# Patient Record
Sex: MALE | Race: OTHER | HISPANIC OR LATINO
[De-identification: names, ages, dates, MRNs, and addresses within clinical notes are randomized per-mention and may not be internally consistent; named-entity substitution may affect disease eponyms.]

---

## 2018-06-28 ENCOUNTER — APPOINTMENT (OUTPATIENT)
Dept: INTERNAL MEDICINE | Facility: CLINIC | Age: 48
End: 2018-06-28

## 2018-06-29 ENCOUNTER — LABORATORY RESULT (OUTPATIENT)
Age: 48
End: 2018-06-29

## 2018-06-29 ENCOUNTER — APPOINTMENT (OUTPATIENT)
Dept: INTERNAL MEDICINE | Facility: CLINIC | Age: 48
End: 2018-06-29

## 2018-06-29 ENCOUNTER — OUTPATIENT (OUTPATIENT)
Dept: OUTPATIENT SERVICES | Facility: HOSPITAL | Age: 48
LOS: 1 days | End: 2018-06-29
Payer: SELF-PAY

## 2018-06-29 VITALS
BODY MASS INDEX: 31.83 KG/M2 | WEIGHT: 210 LBS | DIASTOLIC BLOOD PRESSURE: 70 MMHG | HEIGHT: 68 IN | SYSTOLIC BLOOD PRESSURE: 134 MMHG

## 2018-06-29 DIAGNOSIS — R73.03 PREDIABETES.: ICD-10-CM

## 2018-06-29 DIAGNOSIS — I10 ESSENTIAL (PRIMARY) HYPERTENSION: ICD-10-CM

## 2018-06-29 LAB
HCT VFR BLD CALC: 42.9 % — SIGNIFICANT CHANGE UP (ref 39–50)
HGB BLD-MCNC: 13.5 G/DL — SIGNIFICANT CHANGE UP (ref 13–17)
MCHC RBC-ENTMCNC: 27.9 PG — SIGNIFICANT CHANGE UP (ref 27–34)
MCHC RBC-ENTMCNC: 31.5 GM/DL — LOW (ref 32–36)
MCV RBC AUTO: 88.6 FL — SIGNIFICANT CHANGE UP (ref 80–100)
PLATELET # BLD AUTO: 236 K/UL — SIGNIFICANT CHANGE UP (ref 150–400)
RBC # BLD: 4.84 M/UL — SIGNIFICANT CHANGE UP (ref 4.2–5.8)
RBC # FLD: 13.9 % — SIGNIFICANT CHANGE UP (ref 10.3–14.5)
WBC # BLD: 5.36 K/UL — SIGNIFICANT CHANGE UP (ref 3.8–10.5)
WBC # FLD AUTO: 5.36 K/UL — SIGNIFICANT CHANGE UP (ref 3.8–10.5)

## 2018-06-29 PROCEDURE — 83036 HEMOGLOBIN GLYCOSYLATED A1C: CPT

## 2018-06-29 PROCEDURE — 85027 COMPLETE CBC AUTOMATED: CPT

## 2018-06-29 PROCEDURE — G0463: CPT

## 2018-06-29 PROCEDURE — 80053 COMPREHEN METABOLIC PANEL: CPT

## 2018-06-29 NOTE — REVIEW OF SYSTEMS
[Fever] : no fever [Chills] : no chills [Night Sweats] : no night sweats [Discharge] : no discharge [Pain] : no pain [Vision Problems] : no vision problems [Earache] : no earache [Hearing Loss] : no hearing loss [Nasal Discharge] : no nasal discharge [Chest Pain] : no chest pain [Orthopena] : no orthopnea [Shortness Of Breath] : no shortness of breath [Wheezing] : no wheezing [Abdominal Pain] : no abdominal pain [Nausea] : no nausea [Vomiting] : no vomiting [Dysuria] : no dysuria [Hematuria] : no hematuria [Joint Pain] : no joint pain [Joint Stiffness] : no joint stiffness [Back Pain] : no back pain [Itching] : no itching [Mole Changes] : no mole changes [Headache] : no headache [Dizziness] : no dizziness [Unsteady Walk] : no ataxia [Memory Loss] : no memory loss [Suicidal] : not suicidal [Insomnia] : no insomnia [Easy Bleeding] : no easy bleeding [Easy Bruising] : no easy bruising [FreeTextEntry9] : R>L hand numbness [de-identified] : R>L hand numbness

## 2018-06-29 NOTE — HISTORY OF PRESENT ILLNESS
[Spouse] : spouse [FreeTextEntry8] : Pt is a 47 yo M with PMH gout, dental infection, mixed HLD, Vit D def presenting as an acute visit for hand numbness on the right>left. This has been ongoing for about one month. It gets worse when he goes to sleep. No leg/foot numbness or tingling. Prediabetic on prior visit, diet-controlled. The numbness is localized to his first four fingers. No associated arm or hand weakness reported. He does have hand tingling. For work he is a  in construction. He is right-handed. No pain in wrist/fingers/thumb. No hx of spinal/head trauma or neck pain. He has been going to the gym and weight-lifting more over the past 2 months\par \par Peruvian intep ID # [id number]. Wife arrived and pt elected to have her translate for the final portion of this interview.

## 2018-06-29 NOTE — PHYSICAL EXAM
[No Acute Distress] : no acute distress [Well Nourished] : well nourished [Well Developed] : well developed [Normal Sclera/Conjunctiva] : normal sclera/conjunctiva [PERRL] : pupils equal round and reactive to light [Normal Outer Ear/Nose] : the outer ears and nose were normal in appearance [Normal Oropharynx] : the oropharynx was normal [No JVD] : no jugular venous distention [Supple] : supple [No Respiratory Distress] : no respiratory distress  [Clear to Auscultation] : lungs were clear to auscultation bilaterally [Normal Rate] : normal rate  [Regular Rhythm] : with a regular rhythm [Normal S1, S2] : normal S1 and S2 [No Carotid Bruits] : no carotid bruits [No Edema] : there was no peripheral edema [Soft] : abdomen soft [Non Tender] : non-tender [No HSM] : no HSM [Normal Posterior Cervical Nodes] : no posterior cervical lymphadenopathy [Normal Anterior Cervical Nodes] : no anterior cervical lymphadenopathy [No CVA Tenderness] : no CVA  tenderness [No Spinal Tenderness] : no spinal tenderness [No Joint Swelling] : no joint swelling [Grossly Normal Strength/Tone] : grossly normal strength/tone [No Rash] : no rash [Normal Gait] : normal gait [Coordination Grossly Intact] : coordination grossly intact [Normal Affect] : the affect was normal [Normal Insight/Judgement] : insight and judgment were intact [Acne] : no acne [de-identified] : well-built [de-identified] : positive Phalen's/Tinel's bilaterally, no thenar atrophy, no sensory loss, intact  and bilateral upper extremity strength

## 2018-06-29 NOTE — ASSESSMENT
[FreeTextEntry1] : 49 yo M with PMH gout, dental infection, mixed HLD, Vit D def presenting as an acute visit for hand numbness on the right>left, exam and history concerning for R>L carpal tunnel syndrome.\par \par #Carpal Tunnel Syndrome, bilateral: R>L hand tingling without associated hypothenar atrophy or sensory loss, intact strength. Consistent with mild disease. Conservative management for now. This is a new diagnosis for the patient. \par -nocturnal splinting. Provided with address for local surgical supply to  splint.\par -if progression of symptoms over the next 2-3 weeks, patient will call and will need neuro referral followed by ortho referral at that time.\par -lifestyle and activity counseling provided: no heavy lifting. Pt works as  but will nocturnally splint.\par \par #Mixed HLD: repeat lipid profile next visit, unclear if fasting on prior. Not fasting today.\par \par #PreDM: repeat A1C today, last 6.0%. Pt has been exercising more and watching his diet since last visit.\par -dietary and lifestyle counseling provided.\par \par HCM:\par -lipid profile (defer today, not fasting)\par -A1C\par -Tdap next CPE, deferred today\par -CBC, CMP \par \par Case d/w Dr. Temple\par RTC 2-3 weeks if not improving, o/w for CPE as available

## 2018-06-30 LAB
ALBUMIN SERPL ELPH-MCNC: 4.5 G/DL — SIGNIFICANT CHANGE UP (ref 3.3–5)
ALP SERPL-CCNC: 65 U/L — SIGNIFICANT CHANGE UP (ref 40–120)
ALT FLD-CCNC: 34 U/L — SIGNIFICANT CHANGE UP (ref 10–45)
ANION GAP SERPL CALC-SCNC: 16 MMOL/L — SIGNIFICANT CHANGE UP (ref 5–17)
AST SERPL-CCNC: 28 U/L — SIGNIFICANT CHANGE UP (ref 10–40)
BILIRUB SERPL-MCNC: 0.3 MG/DL — SIGNIFICANT CHANGE UP (ref 0.2–1.2)
BUN SERPL-MCNC: 25 MG/DL — HIGH (ref 7–23)
CALCIUM SERPL-MCNC: 9.8 MG/DL — SIGNIFICANT CHANGE UP (ref 8.4–10.5)
CHLORIDE SERPL-SCNC: 104 MMOL/L — SIGNIFICANT CHANGE UP (ref 96–108)
CO2 SERPL-SCNC: 20 MMOL/L — LOW (ref 22–31)
CREAT SERPL-MCNC: 1.12 MG/DL — SIGNIFICANT CHANGE UP (ref 0.5–1.3)
GLUCOSE SERPL-MCNC: 111 MG/DL — HIGH (ref 70–99)
HBA1C BLD-MCNC: 5.9 % — HIGH (ref 4–5.6)
POTASSIUM SERPL-MCNC: 5 MMOL/L — SIGNIFICANT CHANGE UP (ref 3.5–5.3)
POTASSIUM SERPL-SCNC: 5 MMOL/L — SIGNIFICANT CHANGE UP (ref 3.5–5.3)
PROT SERPL-MCNC: 7.9 G/DL — SIGNIFICANT CHANGE UP (ref 6–8.3)
SODIUM SERPL-SCNC: 140 MMOL/L — SIGNIFICANT CHANGE UP (ref 135–145)

## 2018-07-03 DIAGNOSIS — E78.2 MIXED HYPERLIPIDEMIA: ICD-10-CM

## 2018-07-03 DIAGNOSIS — G56.03 CARPAL TUNNEL SYNDROME, BILATERAL UPPER LIMBS: ICD-10-CM

## 2018-07-03 DIAGNOSIS — R20.0 ANESTHESIA OF SKIN: ICD-10-CM

## 2018-07-03 DIAGNOSIS — R73.03 PREDIABETES: ICD-10-CM

## 2018-07-09 ENCOUNTER — APPOINTMENT (OUTPATIENT)
Dept: INTERNAL MEDICINE | Facility: CLINIC | Age: 48
End: 2018-07-09

## 2018-07-09 DIAGNOSIS — K04.7 PERIAPICAL ABSCESS W/OUT SINUS: ICD-10-CM

## 2018-07-09 DIAGNOSIS — G56.03 CARPAL TUNNEL SYNDROM,BILATERAL UPPER LIMBS: ICD-10-CM

## 2018-07-09 DIAGNOSIS — R20.0 ANESTHESIA OF SKIN: ICD-10-CM

## 2019-02-06 ENCOUNTER — OUTPATIENT (OUTPATIENT)
Dept: OUTPATIENT SERVICES | Facility: HOSPITAL | Age: 49
LOS: 1 days | End: 2019-02-06
Payer: SELF-PAY

## 2019-02-06 ENCOUNTER — APPOINTMENT (OUTPATIENT)
Dept: INTERNAL MEDICINE | Facility: CLINIC | Age: 49
End: 2019-02-06

## 2019-02-06 VITALS
SYSTOLIC BLOOD PRESSURE: 140 MMHG | HEART RATE: 84 BPM | WEIGHT: 218 LBS | DIASTOLIC BLOOD PRESSURE: 90 MMHG | BODY MASS INDEX: 33.04 KG/M2 | HEIGHT: 68 IN

## 2019-02-06 DIAGNOSIS — Z82.0 FAMILY HISTORY OF EPILEPSY AND OTHER DISEASES OF THE NERVOUS SYSTEM: ICD-10-CM

## 2019-02-06 DIAGNOSIS — E88.81 METABOLIC SYNDROME: ICD-10-CM

## 2019-02-06 DIAGNOSIS — E55.9 VITAMIN D DEFICIENCY, UNSPECIFIED: ICD-10-CM

## 2019-02-06 DIAGNOSIS — E66.9 OBESITY, UNSPECIFIED: ICD-10-CM

## 2019-02-06 DIAGNOSIS — I10 ESSENTIAL (PRIMARY) HYPERTENSION: ICD-10-CM

## 2019-02-06 PROCEDURE — G0463: CPT

## 2019-02-07 PROBLEM — Z82.0 FAMILY HISTORY OF ALZHEIMER'S DISEASE: Status: ACTIVE | Noted: 2019-02-06

## 2019-02-07 PROBLEM — E88.81 METABOLIC SYNDROME: Status: ACTIVE | Noted: 2019-02-07

## 2019-02-07 PROBLEM — E66.9 OBESITY (BMI 30-39.9): Status: ACTIVE | Noted: 2019-02-07

## 2019-02-07 NOTE — HISTORY OF PRESENT ILLNESS
[Spouse] : spouse [FreeTextEntry1] : 50 yo Vietnamese-speaking M with PMH gout, dental infection, mixed HLD, Vit D def presenting for follow up of a recently elevated blood pressure.  [de-identified] : Patient's wife translates from [place] with patient's consent. Patient checked his blood pressure at home 10 days ago, which was 147 systolic, unknown diastolic. His blood pressure is usually closer to 120/80, checked at home by his daughter who is a nursing student. \par \par Patient is feeling well now, but was feeling unwell for the past 2 weeks, feeling tired, with posterior headaches, disrupted sleep, however this has been improving over the past week. Denies fever. Last week patient took tylenol for his headache which did not help, but Aleve did help the pain, and now the headache is resolved. The fatigue and disrupted sleep are also "much better". Patient's wife attributes patient's improvement to the fact that she has changed patient's diet recently to increase vegetables and meats, and cut down on rice, bread, and juice. Patient has no trouble falling asleep, but has multiple nighttime awakenings (trouble staying asleep) increasingly over the past month. When he would wake up repeatedly through the night last week, it was due to feeling anxious about the posterior headache he was experiencing.

## 2019-02-07 NOTE — REVIEW OF SYSTEMS
[Fatigue] : fatigue [Vision Problems] : vision problems [Negative] : Psychiatric [FreeTextEntry3] : pt says he has trouble reading small print [de-identified] : occasional left axillary itching [de-identified] : HA last week resolved now

## 2019-02-07 NOTE — ASSESSMENT
[FreeTextEntry1] : 50 yo Kazakh-speaking M with PMH gout, dental infection, mixed HLD, Vit D def presenting for follow up of a recently elevated blood pressure.\par \par # Metabolic Syndrome (ASCVD 5.4%)\par - pre-diabetic A1C, hyperlipidemia, hypertension\par - reinforced diet and exercise (lifestyle modifications) to control BP, will hold off on BP medications at this time\par - consider initiating a statin, metformin, or anti-hypertensive medications if lifestyle modification insufficient\par \par # HCM\par - flu shot and TDAP administered today\par - RTC 3 mo for CPE, fasting to check lipid panel/CBC/CMP, and try to lose 10lb in that time

## 2019-02-07 NOTE — PHYSICAL EXAM
[No Acute Distress] : no acute distress [Well Nourished] : well nourished [Well Developed] : well developed [Well-Appearing] : well-appearing [Normal Sclera/Conjunctiva] : normal sclera/conjunctiva [PERRL] : pupils equal round and reactive to light [EOMI] : extraocular movements intact [Normal Outer Ear/Nose] : the outer ears and nose were normal in appearance [Normal Oropharynx] : the oropharynx was normal [No JVD] : no jugular venous distention [Supple] : supple [No Lymphadenopathy] : no lymphadenopathy [No Respiratory Distress] : no respiratory distress  [Clear to Auscultation] : lungs were clear to auscultation bilaterally [No Accessory Muscle Use] : no accessory muscle use [Normal Rate] : normal rate  [Regular Rhythm] : with a regular rhythm [Soft] : abdomen soft [Non Tender] : non-tender [Normal Bowel Sounds] : normal bowel sounds [No Joint Swelling] : no joint swelling [Grossly Normal Strength/Tone] : grossly normal strength/tone [No Rash] : no rash [Normal Gait] : normal gait [Coordination Grossly Intact] : coordination grossly intact [Normal Affect] : the affect was normal [Normal Insight/Judgement] : insight and judgment were intact [de-identified] : full ROM

## 2019-02-19 DIAGNOSIS — E66.9 OBESITY, UNSPECIFIED: ICD-10-CM

## 2019-02-19 DIAGNOSIS — Z82.0 FAMILY HISTORY OF EPILEPSY AND OTHER DISEASES OF THE NERVOUS SYSTEM: ICD-10-CM

## 2019-02-19 DIAGNOSIS — E88.81 METABOLIC SYNDROME AND OTHER INSULIN RESISTANCE: ICD-10-CM

## 2019-10-23 NOTE — DISCUSSION/SUMMARY
[FreeTextEntry1] : Called pt regarding his back pain and finger pain and advised to come in for an evaluation.  Reported he already has an apt  on 11/6 at 8:30 am

## 2019-11-06 ENCOUNTER — OUTPATIENT (OUTPATIENT)
Dept: OUTPATIENT SERVICES | Facility: HOSPITAL | Age: 49
LOS: 1 days | End: 2019-11-06
Payer: SELF-PAY

## 2019-11-06 ENCOUNTER — APPOINTMENT (OUTPATIENT)
Dept: INTERNAL MEDICINE | Facility: CLINIC | Age: 49
End: 2019-11-06

## 2019-11-06 DIAGNOSIS — M54.5 LOW BACK PAIN: ICD-10-CM

## 2019-11-06 DIAGNOSIS — I10 ESSENTIAL (PRIMARY) HYPERTENSION: ICD-10-CM

## 2019-11-06 PROCEDURE — G0463: CPT

## 2019-12-30 PROBLEM — M54.5 LOW BACK PAIN, UNSPECIFIED BACK PAIN LATERALITY, UNSPECIFIED CHRONICITY, UNSPECIFIED WHETHER SCIATICA PRESENT: Status: ACTIVE | Noted: 2019-12-30

## 2020-01-15 NOTE — HISTORY OF PRESENT ILLNESS
[FreeTextEntry8] : Patient is 48yo male PMH gout, dental infection, mixed HLD, Vit D def presenting with back and foot pain. \par \par Back pain is secondary to bending to the left side, and is described as "pulling" sensation. Better with heat. Denies recent trauma. \par Patient also describes pain in the lateral right foot, made worse by wearing new sneakers. Denies recent trauma, swelling, erythema.

## 2020-01-15 NOTE — ASSESSMENT
[FreeTextEntry1] : Patient is 50yo male presenting with back pain. Appears to be musculoskeletal in nature, with evidence of tense parapsinal muscles.\par \par Patient also with foot pain; likely related to wearing specific shoes as pain only occurs with these sneakers.

## 2020-01-15 NOTE — PHYSICAL EXAM
[No Acute Distress] : no acute distress [Well Nourished] : well nourished [Well Developed] : well developed [Well-Appearing] : well-appearing [Normal Sclera/Conjunctiva] : normal sclera/conjunctiva [EOMI] : extraocular movements intact [PERRL] : pupils equal round and reactive to light [Normal Outer Ear/Nose] : the outer ears and nose were normal in appearance [No Respiratory Distress] : no respiratory distress  [Normal Oropharynx] : the oropharynx was normal [No Accessory Muscle Use] : no accessory muscle use [Clear to Auscultation] : lungs were clear to auscultation bilaterally [Normal Rate] : normal rate  [Regular Rhythm] : with a regular rhythm [Normal S1, S2] : normal S1 and S2 [No Murmur] : no murmur heard [No CVA Tenderness] : no CVA  tenderness [No Spinal Tenderness] : no spinal tenderness [No Joint Swelling] : no joint swelling [Grossly Normal Strength/Tone] : grossly normal strength/tone [No Focal Deficits] : no focal deficits [No Rash] : no rash [Normal Gait] : normal gait [de-identified] : Tenderness to palpation of right lateral foot at tendon insertion. No erythema, swelling. No bone malformations. [de-identified] : Tenderness to paraspinal muscles of lumbar region. Tightness noted in muscles.

## 2020-01-15 NOTE — PLAN
[FreeTextEntry1] : #Back pain 2/2 to musculoskeletal strain\par -conservative treatment for now\par -heat, NSAIDs as needed\par -Stretches\par -PT if needed\par \par #Foot pain\par -ice and NSAIDS as needed\par -switch footwear

## 2020-01-15 NOTE — REVIEW OF SYSTEMS
[Muscle Pain] : muscle pain [Back Pain] : back pain [Fever] : no fever [Fatigue] : no fatigue [Discharge] : no discharge [Vision Problems] : no vision problems [Sore Throat] : no sore throat [Nasal Discharge] : no nasal discharge [Chest Pain] : no chest pain [Palpitations] : no palpitations [Lower Ext Edema] : no lower extremity edema [Shortness Of Breath] : no shortness of breath [Cough] : no cough [Abdominal Pain] : no abdominal pain [Vomiting] : no vomiting [Nausea] : no nausea [Melena] : no melena [Dysuria] : no dysuria [Joint Stiffness] : no joint stiffness [Joint Pain] : no joint pain [Hematuria] : no hematuria [Itching] : no itching [Skin Rash] : no skin rash [Headache] : no headache [Dizziness] : no dizziness

## 2021-06-03 ENCOUNTER — LABORATORY RESULT (OUTPATIENT)
Age: 51
End: 2021-06-03

## 2021-06-04 ENCOUNTER — APPOINTMENT (OUTPATIENT)
Dept: INTERNAL MEDICINE | Facility: CLINIC | Age: 51
End: 2021-06-04

## 2021-06-04 ENCOUNTER — OUTPATIENT (OUTPATIENT)
Dept: OUTPATIENT SERVICES | Facility: HOSPITAL | Age: 51
LOS: 1 days | End: 2021-06-04
Payer: SELF-PAY

## 2021-06-04 VITALS
DIASTOLIC BLOOD PRESSURE: 80 MMHG | SYSTOLIC BLOOD PRESSURE: 140 MMHG | BODY MASS INDEX: 32.74 KG/M2 | OXYGEN SATURATION: 96 % | HEIGHT: 68 IN | HEART RATE: 76 BPM | WEIGHT: 216 LBS

## 2021-06-04 DIAGNOSIS — F32.9 MAJOR DEPRESSIVE DISORDER, SINGLE EPISODE, UNSPECIFIED: ICD-10-CM

## 2021-06-04 DIAGNOSIS — Z23 ENCOUNTER FOR IMMUNIZATION: ICD-10-CM

## 2021-06-04 DIAGNOSIS — R53.83 OTHER FATIGUE: ICD-10-CM

## 2021-06-04 DIAGNOSIS — I10 ESSENTIAL (PRIMARY) HYPERTENSION: ICD-10-CM

## 2021-06-04 PROCEDURE — G0463: CPT

## 2021-06-04 PROCEDURE — 85027 COMPLETE CBC AUTOMATED: CPT

## 2021-06-04 PROCEDURE — 84443 ASSAY THYROID STIM HORMONE: CPT

## 2021-06-04 PROCEDURE — 80053 COMPREHEN METABOLIC PANEL: CPT

## 2021-06-04 PROCEDURE — 80061 LIPID PANEL: CPT

## 2021-06-05 LAB
ALBUMIN SERPL ELPH-MCNC: 4.9 G/DL — SIGNIFICANT CHANGE UP (ref 3.3–5)
ALP SERPL-CCNC: 75 U/L — SIGNIFICANT CHANGE UP (ref 40–120)
ALT FLD-CCNC: 52 U/L — HIGH (ref 10–45)
ANION GAP SERPL CALC-SCNC: 15 MMOL/L — SIGNIFICANT CHANGE UP (ref 5–17)
AST SERPL-CCNC: 34 U/L — SIGNIFICANT CHANGE UP (ref 10–40)
BILIRUB SERPL-MCNC: 0.4 MG/DL — SIGNIFICANT CHANGE UP (ref 0.2–1.2)
BUN SERPL-MCNC: 19 MG/DL — SIGNIFICANT CHANGE UP (ref 7–23)
CALCIUM SERPL-MCNC: 10.2 MG/DL — SIGNIFICANT CHANGE UP (ref 8.4–10.5)
CHLORIDE SERPL-SCNC: 101 MMOL/L — SIGNIFICANT CHANGE UP (ref 96–108)
CHOLEST SERPL-MCNC: 265 MG/DL — HIGH
CO2 SERPL-SCNC: 26 MMOL/L — SIGNIFICANT CHANGE UP (ref 22–31)
CREAT SERPL-MCNC: 1.06 MG/DL — SIGNIFICANT CHANGE UP (ref 0.5–1.3)
GLUCOSE SERPL-MCNC: 67 MG/DL — LOW (ref 70–99)
HCT VFR BLD CALC: 44.9 % — SIGNIFICANT CHANGE UP (ref 39–50)
HDLC SERPL-MCNC: 48 MG/DL — SIGNIFICANT CHANGE UP
HGB BLD-MCNC: 14.3 G/DL — SIGNIFICANT CHANGE UP (ref 13–17)
LIPID PNL WITH DIRECT LDL SERPL: 161 MG/DL — HIGH
MCHC RBC-ENTMCNC: 28.9 PG — SIGNIFICANT CHANGE UP (ref 27–34)
MCHC RBC-ENTMCNC: 31.8 GM/DL — LOW (ref 32–36)
MCV RBC AUTO: 90.7 FL — SIGNIFICANT CHANGE UP (ref 80–100)
NON HDL CHOLESTEROL: 217 MG/DL — HIGH
PLATELET # BLD AUTO: 223 K/UL — SIGNIFICANT CHANGE UP (ref 150–400)
POTASSIUM SERPL-MCNC: 4.6 MMOL/L — SIGNIFICANT CHANGE UP (ref 3.5–5.3)
POTASSIUM SERPL-SCNC: 4.6 MMOL/L — SIGNIFICANT CHANGE UP (ref 3.5–5.3)
PROT SERPL-MCNC: 7.7 G/DL — SIGNIFICANT CHANGE UP (ref 6–8.3)
RBC # BLD: 4.95 M/UL — SIGNIFICANT CHANGE UP (ref 4.2–5.8)
RBC # FLD: 13.2 % — SIGNIFICANT CHANGE UP (ref 10.3–14.5)
SODIUM SERPL-SCNC: 141 MMOL/L — SIGNIFICANT CHANGE UP (ref 135–145)
T4 FREE+ TSH PNL SERPL: 1.5 UIU/ML — SIGNIFICANT CHANGE UP (ref 0.27–4.2)
TRIGL SERPL-MCNC: 282 MG/DL — HIGH
WBC # BLD: 4.66 K/UL — SIGNIFICANT CHANGE UP (ref 3.8–10.5)
WBC # FLD AUTO: 4.66 K/UL — SIGNIFICANT CHANGE UP (ref 3.8–10.5)

## 2021-06-07 PROBLEM — F32.9 DEPRESSION: Status: ACTIVE | Noted: 2021-06-04

## 2021-06-07 NOTE — HEALTH RISK ASSESSMENT
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

## 2021-06-07 NOTE — HISTORY OF PRESENT ILLNESS
[FreeTextEntry1] : CPE [de-identified] : Patient is a [place] citizen speaker and refused an  numerous times. He insisted on communicating in English with me and also using his wife, who is fluent in English, [place] citizen, and English communicate/interpret.\par \par Patient is a 52 yo male with a hx of gout, HLD, vitamin d deficiency (not on any medication) who presents to the clinic for an annual visit. Patient states that he feels otherwise well. However, he has stated that his mood has not been well recently. Of note, he has noticed increased stressors in his life. Since the last year, he has had increased financial strain since his job had temporarily let him go and his wife lost his job. They were able to make ends meet with the assistance of their daughter who financially supported them for several months. Furthermore, they have been having marital issues that have been on-going for several years. His wife has asked for a separation. However, the patient has refused to acknowledge the divorce/separation and insists on trying to work things out. Furthermore, they have had a younger daughter who has been acting out and causing them much grief. He has also lost both his parents in the last five years (not due to COVID) and several other family members). Of note, patient has had a hx of remote suicidal ideation (more than 15 years ago), but no recent ideation/intent/plan. About 15 years ago, the first time his wife asked for a separation, patient wanted to end his life. So, he went to the train tracks and was about to lie down. His sister chased him down at the train tracks and convinced him to stop. Since then, he has not had these thoughts, and denies any recent thoughts/intents/plans. He denies having any guns or weapons in the house.

## 2021-06-07 NOTE — PLAN
[FreeTextEntry1] :  52 yo male with a hx of gout, HLD, vitamin d deficiency (not on any medication) who presents to the clinic for an annual visit, found to have newly diagnosed depression.\par - Follow up at 2 weeks and 5 weeks with telehealth for Depression follow up.\par - HCM: Colonoscopy Outpatient offered and accepted\par - Routine labwork ordered\par - Case d/w Dr. Meza

## 2021-06-07 NOTE — PHYSICAL EXAM
[No Acute Distress] : no acute distress [Well Nourished] : well nourished [Well Developed] : well developed [Well-Appearing] : well-appearing [Normal Sclera/Conjunctiva] : normal sclera/conjunctiva [PERRL] : pupils equal round and reactive to light [EOMI] : extraocular movements intact [Normal Outer Ear/Nose] : the outer ears and nose were normal in appearance [Normal Oropharynx] : the oropharynx was normal [No JVD] : no jugular venous distention [No Lymphadenopathy] : no lymphadenopathy [Supple] : supple [Thyroid Normal, No Nodules] : the thyroid was normal and there were no nodules present [No Respiratory Distress] : no respiratory distress  [No Accessory Muscle Use] : no accessory muscle use [Clear to Auscultation] : lungs were clear to auscultation bilaterally [Normal Rate] : normal rate  [Regular Rhythm] : with a regular rhythm [Normal S1, S2] : normal S1 and S2 [No Murmur] : no murmur heard [No Carotid Bruits] : no carotid bruits [No Abdominal Bruit] : a ~M bruit was not heard ~T in the abdomen [No Varicosities] : no varicosities [Pedal Pulses Present] : the pedal pulses are present [No Edema] : there was no peripheral edema [No Palpable Aorta] : no palpable aorta [No Extremity Clubbing/Cyanosis] : no extremity clubbing/cyanosis [Soft] : abdomen soft [Non Tender] : non-tender [Non-distended] : non-distended [No Masses] : no abdominal mass palpated [No HSM] : no HSM [Normal Bowel Sounds] : normal bowel sounds [Normal Posterior Cervical Nodes] : no posterior cervical lymphadenopathy [Normal Anterior Cervical Nodes] : no anterior cervical lymphadenopathy [No CVA Tenderness] : no CVA  tenderness [No Spinal Tenderness] : no spinal tenderness [No Joint Swelling] : no joint swelling [Grossly Normal Strength/Tone] : grossly normal strength/tone [Coordination Grossly Intact] : coordination grossly intact [No Rash] : no rash [No Focal Deficits] : no focal deficits [Normal Gait] : normal gait [Deep Tendon Reflexes (DTR)] : deep tendon reflexes were 2+ and symmetric [Memory Grossly Normal] : memory grossly normal [Normal Affect] : the affect was normal [Alert and Oriented x3] : oriented to person, place, and time [Normal Insight/Judgement] : insight and judgment were intact [de-identified] : Depressed mood, teary affect

## 2021-06-09 ENCOUNTER — NON-APPOINTMENT (OUTPATIENT)
Age: 51
End: 2021-06-09

## 2021-06-15 DIAGNOSIS — F32.9 MAJOR DEPRESSIVE DISORDER, SINGLE EPISODE, UNSPECIFIED: ICD-10-CM

## 2021-06-29 ENCOUNTER — RX RENEWAL (OUTPATIENT)
Age: 51
End: 2021-06-29

## 2021-06-29 ENCOUNTER — NON-APPOINTMENT (OUTPATIENT)
Age: 51
End: 2021-06-29

## 2021-11-30 ENCOUNTER — RX RENEWAL (OUTPATIENT)
Age: 51
End: 2021-11-30

## 2022-01-27 ENCOUNTER — NON-APPOINTMENT (OUTPATIENT)
Age: 52
End: 2022-01-27

## 2022-01-28 ENCOUNTER — APPOINTMENT (OUTPATIENT)
Dept: INTERNAL MEDICINE | Facility: CLINIC | Age: 52
End: 2022-01-28

## 2022-12-21 ENCOUNTER — NON-APPOINTMENT (OUTPATIENT)
Age: 52
End: 2022-12-21

## 2023-05-19 ENCOUNTER — RESULT REVIEW (OUTPATIENT)
Age: 53
End: 2023-05-19

## 2023-05-19 ENCOUNTER — OUTPATIENT (OUTPATIENT)
Dept: OUTPATIENT SERVICES | Facility: HOSPITAL | Age: 53
LOS: 1 days | End: 2023-05-19
Payer: SELF-PAY

## 2023-05-19 ENCOUNTER — APPOINTMENT (OUTPATIENT)
Dept: INTERNAL MEDICINE | Facility: CLINIC | Age: 53
End: 2023-05-19
Payer: COMMERCIAL

## 2023-05-19 VITALS
HEART RATE: 75 BPM | OXYGEN SATURATION: 97 % | SYSTOLIC BLOOD PRESSURE: 140 MMHG | WEIGHT: 237 LBS | DIASTOLIC BLOOD PRESSURE: 80 MMHG | HEIGHT: 68 IN | BODY MASS INDEX: 35.92 KG/M2

## 2023-05-19 DIAGNOSIS — I10 ESSENTIAL (PRIMARY) HYPERTENSION: ICD-10-CM

## 2023-05-19 DIAGNOSIS — S96.912A STRAIN OF UNSPECIFIED MUSCLE AND TENDON AT ANKLE AND FOOT LEVEL, LEFT FOOT, INITIAL ENCOUNTER: ICD-10-CM

## 2023-05-19 PROCEDURE — ZZZZZ: CPT

## 2023-05-19 NOTE — HISTORY OF PRESENT ILLNESS
[Musculoskeletal Symptoms Legs] : leg [Rest] : rest [Ice] : ice [OTC Remedies] : OTC remedies [Activity] : with activity [Worsening] : worsening [Family Member] : family member [FreeTextEntry1] : 3 months ago [FreeTextEntry3] : 6-7 [FreeTextEntry7] : L foot/ankle [FreeTextEntry8] : 53 year old man with a history of HLD, MDD presenting with complaint of L foot pain for several months. \par \par Twisted his L foot 6 months ago while running, the pain stopped for awhile and then came back a few months ago. During the intial injury he twisted his foot inward. Now feels pain on the lateral aspect of the ankle and the medial aspect near the big toe. \par \par Works as a  and has to stand on a ladder, foot hurts at the end of the work day, ankle and sole of foot. Pins and needles. No numbness or radiating. States that it is swollen.\par Has been taking 600mg ibuprofen daily and it helps. Has also been icing it. \par No prior injuries. \par \par Chart history of gout but patient denies.

## 2023-05-19 NOTE — PHYSICAL EXAM
[Normal] : normal rate, regular rhythm, normal S1 and S2 and no murmur heard [de-identified] : edema of L ankle join around the malleolus, non tender to palpation, pain elicited with eversion and inversion of L foot, no obvious deformity

## 2023-05-19 NOTE — ASSESSMENT
[FreeTextEntry1] : 53 year old man with a history of MDD, HLD, HTN presenting for acute visit complaining of worsening L foot pain for several months following an injury. Exam is notable for swelling of the lateral L ankle and pain with internal and external rotation of the ankle joint. \par \par #L ankle injury\par strain vs ligamentous injury vs fracture\par - Xray of L foot and ankle\par - ortho referral given\par - continue with ibuprofen 600 mg qhs\par - continue with daily ice and elevation\par - tylenol 625mg qd \par \par #HTN\par - c/w losartan 25mg qd\par - managed by another PCP \par \par #HCM\par - per PCP\par \par Case discussed with Dr. Calhoun.\par Ghazala Woodard, PGY1 Psychiatry Resident

## 2023-05-20 ENCOUNTER — APPOINTMENT (OUTPATIENT)
Dept: RADIOLOGY | Facility: CLINIC | Age: 53
End: 2023-05-20

## 2023-05-20 DIAGNOSIS — S96.912A STRAIN OF UNSPECIFIED MUSCLE AND TENDON AT ANKLE AND FOOT LEVEL, LEFT FOOT, INITIAL ENCOUNTER: ICD-10-CM

## 2023-05-20 PROCEDURE — 73630 X-RAY EXAM OF FOOT: CPT | Mod: 26,LT

## 2023-05-20 PROCEDURE — G0463: CPT

## 2023-05-20 PROCEDURE — 73630 X-RAY EXAM OF FOOT: CPT

## 2023-05-20 PROCEDURE — 73600 X-RAY EXAM OF ANKLE: CPT

## 2023-05-20 PROCEDURE — 73600 X-RAY EXAM OF ANKLE: CPT | Mod: 26,LT

## 2023-06-19 ENCOUNTER — APPOINTMENT (OUTPATIENT)
Dept: INTERNAL MEDICINE | Facility: CLINIC | Age: 53
End: 2023-06-19
Payer: COMMERCIAL

## 2023-06-19 ENCOUNTER — MED ADMIN CHARGE (OUTPATIENT)
Age: 53
End: 2023-06-19

## 2023-06-19 ENCOUNTER — OUTPATIENT (OUTPATIENT)
Dept: OUTPATIENT SERVICES | Facility: HOSPITAL | Age: 53
LOS: 1 days | End: 2023-06-19
Payer: SELF-PAY

## 2023-06-19 VITALS
OXYGEN SATURATION: 98 % | SYSTOLIC BLOOD PRESSURE: 152 MMHG | HEIGHT: 68 IN | WEIGHT: 246 LBS | DIASTOLIC BLOOD PRESSURE: 98 MMHG | HEART RATE: 80 BPM | BODY MASS INDEX: 37.28 KG/M2

## 2023-06-19 VITALS — SYSTOLIC BLOOD PRESSURE: 142 MMHG | DIASTOLIC BLOOD PRESSURE: 90 MMHG

## 2023-06-19 DIAGNOSIS — I10 ESSENTIAL (PRIMARY) HYPERTENSION: ICD-10-CM

## 2023-06-19 DIAGNOSIS — E78.2 MIXED HYPERLIPIDEMIA: ICD-10-CM

## 2023-06-19 DIAGNOSIS — Z00.00 ENCOUNTER FOR GENERAL ADULT MEDICAL EXAMINATION W/OUT ABNORMAL FINDINGS: ICD-10-CM

## 2023-06-19 DIAGNOSIS — Z12.11 ENCOUNTER FOR SCREENING FOR MALIGNANT NEOPLASM OF COLON: ICD-10-CM

## 2023-06-19 DIAGNOSIS — R51.9 HEADACHE, UNSPECIFIED: ICD-10-CM

## 2023-06-19 PROCEDURE — G0463: CPT

## 2023-06-19 PROCEDURE — 80061 LIPID PANEL: CPT

## 2023-06-19 PROCEDURE — T1013: CPT

## 2023-06-19 PROCEDURE — 80053 COMPREHEN METABOLIC PANEL: CPT

## 2023-06-19 PROCEDURE — 83036 HEMOGLOBIN GLYCOSYLATED A1C: CPT

## 2023-06-19 PROCEDURE — 36415 COLL VENOUS BLD VENIPUNCTURE: CPT

## 2023-06-19 PROCEDURE — ZZZZZ: CPT | Mod: GE

## 2023-06-19 PROCEDURE — 85025 COMPLETE CBC W/AUTO DIFF WBC: CPT

## 2023-06-19 RX ORDER — VENLAFAXINE 37.5 MG/1
37.5 TABLET ORAL
Qty: 90 | Refills: 0 | Status: ACTIVE | COMMUNITY
Start: 2021-06-04 | End: 1900-01-01

## 2023-06-20 NOTE — INTERPRETER SERVICES
[Time Spent: ____ minutes] : Total time spent using  services: [unfilled] minutes. The patient's primary language is not English thus required  services. [Interpreters_IDNumber] : [id number] [Interpreters_FullName] : Cornell [TWNoteComboBox1] : Kinyarwanda

## 2023-06-20 NOTE — INTERPRETER SERVICES
[Time Spent: ____ minutes] : Total time spent using  services: [unfilled] minutes. The patient's primary language is not English thus required  services. [Interpreters_IDNumber] : [id number] [Interpreters_FullName] : Cornell [TWNoteComboBox1] : Tajik

## 2023-06-20 NOTE — HISTORY OF PRESENT ILLNESS
[FreeTextEntry1] : Patient is here for a comprehensive physical exam.\par  [de-identified] : 53M w/ HTN, HLD, MDD who presents for a CPE.\par \par Pt. overall feels well. Today, reports posterior headache, episodic, 5/10 in severity. Headache is gradual onset but not severe in quality, located primarily in the back of the head. States he intermittently has ringing in the ears but denies visual changes, imbalance, altered mental status, fevers, chills, weight loss, positional change, photophobia, phonophobia, neck stiffness, nausea, or vomiting. He states he takes Tylenol occasionally with relief. Of note, pt. states he checks his BP during these episodes; readings tend to be 140s/90s.\par \par Regarding HTN, pt. has been taking losartan 25mg qd without issues or side effects. BPs at home usually around 140s/90s. Denies blurry vision, severe headaches, chest pain, SOB, dizziness, etc.\par \par Regarding MDD, pt. has been taking venlafaxine 37.5mg qd for the past 2 years without significant issues or side effects. Denies any SI/HI but does report decreased energy at times. States he has seen psychiatry once. \par \par From a HCM standpoint, pt. is due for Shingles vaccine and colonoscopy. He is COVID vaccinated x3, UTD on Tdap and other screenings. SDOH screening negative.

## 2023-06-20 NOTE — PHYSICAL EXAM
[No Acute Distress] : no acute distress [Well Nourished] : well nourished [Well Developed] : well developed [Well-Appearing] : well-appearing [PERRL] : pupils equal round and reactive to light [EOMI] : extraocular movements intact [Normal Outer Ear/Nose] : the outer ears and nose were normal in appearance [Normal Oropharynx] : the oropharynx was normal [No JVD] : no jugular venous distention [No Lymphadenopathy] : no lymphadenopathy [No Respiratory Distress] : no respiratory distress  [No Accessory Muscle Use] : no accessory muscle use [Clear to Auscultation] : lungs were clear to auscultation bilaterally [Normal Rate] : normal rate  [Regular Rhythm] : with a regular rhythm [Normal S1, S2] : normal S1 and S2 [No Murmur] : no murmur heard [Soft] : abdomen soft [Non Tender] : non-tender [Non-distended] : non-distended [No Masses] : no abdominal mass palpated [No HSM] : no HSM [No Focal Deficits] : no focal deficits [Normal Gait] : normal gait [Normal Affect] : the affect was normal [Alert and Oriented x3] : oriented to person, place, and time [Normal] : the sensory exam was normal [No Edema] : there was no peripheral edema [No Joint Swelling] : no joint swelling [No Rash] : no rash [de-identified] : no neck stiffness

## 2023-06-20 NOTE — HEALTH RISK ASSESSMENT
[Very Good] : ~his/her~  mood as very good [Yes] : Yes [Monthly or less (1 pt)] : Monthly or less (1 point) [1 or 2 (0 pts)] : 1 or 2 (0 points) [Never (0 pts)] : Never (0 points) [No] : In the past 12 months have you used drugs other than those required for medical reasons? No [No falls in past year] : Patient reported no falls in the past year [0] : 2) Feeling down, depressed, or hopeless: Not at all (0) [PHQ-2 Negative - No further assessment needed] : PHQ-2 Negative - No further assessment needed [None] : None [With Family] : lives with family [Employed] : employed [] :  [# Of Children ___] : has [unfilled] children [Sexually Active] : sexually active [Feels Safe at Home] : Feels safe at home [Fully functional (bathing, dressing, toileting, transferring, walking, feeding)] : Fully functional (bathing, dressing, toileting, transferring, walking, feeding) [Fully functional (using the telephone, shopping, preparing meals, housekeeping, doing laundry, using] : Fully functional and needs no help or supervision to perform IADLs (using the telephone, shopping, preparing meals, housekeeping, doing laundry, using transportation, managing medications and managing finances) [Never] : Never [Audit-CScore] : 1 [de-identified] : used to gym prior to COVID but has not gone recently; intends to restart [de-identified] : has been eating less than usual. Breakfast- coffee with milk/break. Lunch/Dinner- meat, rice, beans, veggies. +Fruits [Change in mental status noted] : No change in mental status noted [Reports changes in hearing] : Reports no changes in hearing [Reports changes in vision] : Reports no changes in vision [Reports changes in dental health] : Reports no changes in dental health [de-identified] : wife and 2 daughters [FreeTextEntry2] :  [de-identified] : no protection

## 2023-06-20 NOTE — PHYSICAL EXAM
[No Acute Distress] : no acute distress [Well Nourished] : well nourished [Well Developed] : well developed [Well-Appearing] : well-appearing [PERRL] : pupils equal round and reactive to light [EOMI] : extraocular movements intact [Normal Outer Ear/Nose] : the outer ears and nose were normal in appearance [Normal Oropharynx] : the oropharynx was normal [No JVD] : no jugular venous distention [No Lymphadenopathy] : no lymphadenopathy [No Respiratory Distress] : no respiratory distress  [No Accessory Muscle Use] : no accessory muscle use [Clear to Auscultation] : lungs were clear to auscultation bilaterally [Normal Rate] : normal rate  [Regular Rhythm] : with a regular rhythm [Normal S1, S2] : normal S1 and S2 [No Murmur] : no murmur heard [Soft] : abdomen soft [Non Tender] : non-tender [Non-distended] : non-distended [No Masses] : no abdominal mass palpated [No HSM] : no HSM [No Focal Deficits] : no focal deficits [Normal Gait] : normal gait [Normal Affect] : the affect was normal [Alert and Oriented x3] : oriented to person, place, and time [Normal] : the sensory exam was normal [No Edema] : there was no peripheral edema [No Joint Swelling] : no joint swelling [No Rash] : no rash [de-identified] : no neck stiffness

## 2023-06-20 NOTE — HEALTH RISK ASSESSMENT
[Very Good] : ~his/her~  mood as very good [Yes] : Yes [Monthly or less (1 pt)] : Monthly or less (1 point) [1 or 2 (0 pts)] : 1 or 2 (0 points) [Never (0 pts)] : Never (0 points) [No] : In the past 12 months have you used drugs other than those required for medical reasons? No [No falls in past year] : Patient reported no falls in the past year [0] : 2) Feeling down, depressed, or hopeless: Not at all (0) [PHQ-2 Negative - No further assessment needed] : PHQ-2 Negative - No further assessment needed [None] : None [With Family] : lives with family [Employed] : employed [] :  [# Of Children ___] : has [unfilled] children [Sexually Active] : sexually active [Feels Safe at Home] : Feels safe at home [Fully functional (bathing, dressing, toileting, transferring, walking, feeding)] : Fully functional (bathing, dressing, toileting, transferring, walking, feeding) [Fully functional (using the telephone, shopping, preparing meals, housekeeping, doing laundry, using] : Fully functional and needs no help or supervision to perform IADLs (using the telephone, shopping, preparing meals, housekeeping, doing laundry, using transportation, managing medications and managing finances) [Never] : Never [Audit-CScore] : 1 [de-identified] : used to gym prior to COVID but has not gone recently; intends to restart [de-identified] : has been eating less than usual. Breakfast- coffee with milk/break. Lunch/Dinner- meat, rice, beans, veggies. +Fruits [Change in mental status noted] : No change in mental status noted [Reports changes in hearing] : Reports no changes in hearing [Reports changes in vision] : Reports no changes in vision [Reports changes in dental health] : Reports no changes in dental health [de-identified] : wife and 2 daughters [FreeTextEntry2] :  [de-identified] : no protection

## 2023-06-20 NOTE — ASSESSMENT
[FreeTextEntry1] : 53M w/ HTN, HLD, MDD who presents for a CPE. Overall feels well but reporting episodic posterior headache, 5/10 in severity, relieved by Tylenol\par \par #Headache:\par - episodic headache, posterior, 5/10 in severity, gradual-onset, non-positional, a/w occasional tinnitus and relieved by Tylenol\par - no red flag symptoms warranting neuroimaging, including fevers, chills, focal neurologic deficits (weakness, numbness, tingling), sudden-onset "thunderclap" quality, positional, nausea, vomiting, etc.\par - c/w Tylenol and Ibuprofen PRN, warm packs to posterior neck/head\par - counseled pt. to visit ED if he has worsening symptoms or development of any red flag symptoms as above\par \par #HTN:\par - on losartan 25mg qd at home\par - BP at home: 140s/90s; BP in office: 152/98, repeat 142/90\par - c/w losartan 25mg qd; refills managed by another PCP\par - monitor BPs periodically\par - encouraged healthy lifestyle modifications including low-salt diet and physical activity \par \par #HLD:\par - not on any medications\par - f/u lipid panel;  in 6/2021\par - encouraged healthy lifestyle modifications including low-fat/cholesterol diet and physical activity \par \par #MDD:\par - on venlafaxine 37.5mg qd at home\par - feeling a little tired and with decreased energy, no SI/HI\par - c/w venlafaxine 37.5mg qd; refill sent to pharmacy \par - outpatient psychiatry follow up\par \par #HCM\par - labwork: CBC, BMP, A1c, lipid panel\par - vaccinations:\par      - Flu: did not receive this season\par      - COVID: vaccinated x3; encouraged pt. to visit local pharmacy to obtain bivalent booster\par      - TDap: 2/2019; next due 2029\par      - Shingles: administered today\par - screenings:\par      - colonoscopy: referral in place\par      - SDOH screening negative\par \par RTC in 1 year for CPE\par \par case d/w Dr. Lemons\par \par Magnolia Landis MD\par PGY-1, Internal Medicine

## 2023-06-20 NOTE — HISTORY OF PRESENT ILLNESS
[FreeTextEntry1] : Patient is here for a comprehensive physical exam.\par  [de-identified] : 53M w/ HTN, HLD, MDD who presents for a CPE.\par \par Pt. overall feels well. Today, reports posterior headache, episodic, 5/10 in severity. Headache is gradual onset but not severe in quality, located primarily in the back of the head. States he intermittently has ringing in the ears but denies visual changes, imbalance, altered mental status, fevers, chills, weight loss, positional change, photophobia, phonophobia, neck stiffness, nausea, or vomiting. He states he takes Tylenol occasionally with relief. Of note, pt. states he checks his BP during these episodes; readings tend to be 140s/90s.\par \par Regarding HTN, pt. has been taking losartan 25mg qd without issues or side effects. BPs at home usually around 140s/90s. Denies blurry vision, severe headaches, chest pain, SOB, dizziness, etc.\par \par Regarding MDD, pt. has been taking venlafaxine 37.5mg qd for the past 2 years without significant issues or side effects. Denies any SI/HI but does report decreased energy at times. States he has seen psychiatry once. \par \par From a HCM standpoint, pt. is due for Shingles vaccine and colonoscopy. He is COVID vaccinated x3, UTD on Tdap and other screenings. SDOH screening negative.

## 2023-06-21 ENCOUNTER — NON-APPOINTMENT (OUTPATIENT)
Age: 53
End: 2023-06-21

## 2023-06-21 LAB
ALBUMIN SERPL ELPH-MCNC: 4.6 G/DL
ALP BLD-CCNC: 102 U/L
ALT SERPL-CCNC: 79 U/L
ANION GAP SERPL CALC-SCNC: 13 MMOL/L
AST SERPL-CCNC: 53 U/L
BILIRUB SERPL-MCNC: 0.4 MG/DL
BUN SERPL-MCNC: 18 MG/DL
CALCIUM SERPL-MCNC: 9.7 MG/DL
CHLORIDE SERPL-SCNC: 103 MMOL/L
CHOLEST SERPL-MCNC: 300 MG/DL
CO2 SERPL-SCNC: 23 MMOL/L
CREAT SERPL-MCNC: 0.92 MG/DL
EGFR: 99 ML/MIN/1.73M2
ESTIMATED AVERAGE GLUCOSE: 128 MG/DL
GLUCOSE SERPL-MCNC: 120 MG/DL
HBA1C MFR BLD HPLC: 6.1 %
HDLC SERPL-MCNC: 51 MG/DL
LDLC SERPL CALC-MCNC: 196 MG/DL
NONHDLC SERPL-MCNC: 250 MG/DL
POTASSIUM SERPL-SCNC: 4.4 MMOL/L
PROT SERPL-MCNC: 7.6 G/DL
SODIUM SERPL-SCNC: 139 MMOL/L
TRIGL SERPL-MCNC: 268 MG/DL

## 2023-06-28 DIAGNOSIS — E78.2 MIXED HYPERLIPIDEMIA: ICD-10-CM

## 2023-06-28 DIAGNOSIS — Z12.11 ENCOUNTER FOR SCREENING FOR MALIGNANT NEOPLASM OF COLON: ICD-10-CM

## 2023-06-28 DIAGNOSIS — R51.9 HEADACHE, UNSPECIFIED: ICD-10-CM

## 2023-06-28 DIAGNOSIS — Z00.00 ENCOUNTER FOR GENERAL ADULT MEDICAL EXAMINATION WITHOUT ABNORMAL FINDINGS: ICD-10-CM

## 2024-03-27 RX ORDER — LOSARTAN POTASSIUM 25 MG/1
25 TABLET, FILM COATED ORAL DAILY
Qty: 3 | Refills: 2 | Status: ACTIVE | COMMUNITY
Start: 2023-05-19 | End: 1900-01-01

## 2024-06-26 RX ORDER — ATORVASTATIN CALCIUM 40 MG/1
40 TABLET, FILM COATED ORAL
Qty: 1 | Refills: 3 | Status: ACTIVE | COMMUNITY
Start: 2023-06-21 | End: 1900-01-01

## 2024-07-31 ENCOUNTER — APPOINTMENT (OUTPATIENT)
Dept: INTERNAL MEDICINE | Facility: CLINIC | Age: 54
End: 2024-07-31

## 2024-08-02 ENCOUNTER — APPOINTMENT (OUTPATIENT)
Dept: INTERNAL MEDICINE | Facility: CLINIC | Age: 54
End: 2024-08-02

## 2024-08-23 ENCOUNTER — APPOINTMENT (OUTPATIENT)
Dept: INTERNAL MEDICINE | Facility: CLINIC | Age: 54
End: 2024-08-23

## 2024-10-29 ENCOUNTER — APPOINTMENT (OUTPATIENT)
Dept: INTERNAL MEDICINE | Facility: CLINIC | Age: 54
End: 2024-10-29

## 2024-12-26 ENCOUNTER — RX RENEWAL (OUTPATIENT)
Age: 54
End: 2024-12-26

## 2025-01-14 ENCOUNTER — APPOINTMENT (OUTPATIENT)
Dept: CARDIOLOGY | Facility: CLINIC | Age: 55
End: 2025-01-14
Payer: COMMERCIAL

## 2025-01-14 ENCOUNTER — NON-APPOINTMENT (OUTPATIENT)
Age: 55
End: 2025-01-14

## 2025-01-14 VITALS — DIASTOLIC BLOOD PRESSURE: 88 MMHG | SYSTOLIC BLOOD PRESSURE: 145 MMHG

## 2025-01-14 VITALS
OXYGEN SATURATION: 97 % | BODY MASS INDEX: 36.07 KG/M2 | SYSTOLIC BLOOD PRESSURE: 159 MMHG | HEIGHT: 68 IN | WEIGHT: 238 LBS | DIASTOLIC BLOOD PRESSURE: 85 MMHG | HEART RATE: 68 BPM | RESPIRATION RATE: 18 BRPM

## 2025-01-14 DIAGNOSIS — E78.2 MIXED HYPERLIPIDEMIA: ICD-10-CM

## 2025-01-14 DIAGNOSIS — I10 ESSENTIAL (PRIMARY) HYPERTENSION: ICD-10-CM

## 2025-01-14 DIAGNOSIS — E88.810 METABOLIC SYNDROME: ICD-10-CM

## 2025-01-14 DIAGNOSIS — E66.9 OBESITY, UNSPECIFIED: ICD-10-CM

## 2025-01-14 DIAGNOSIS — R07.9 CHEST PAIN, UNSPECIFIED: ICD-10-CM

## 2025-01-14 PROCEDURE — 99204 OFFICE O/P NEW MOD 45 MIN: CPT | Mod: 25

## 2025-01-14 PROCEDURE — 93000 ELECTROCARDIOGRAM COMPLETE: CPT

## 2025-01-14 RX ORDER — LOSARTAN POTASSIUM AND HYDROCHLOROTHIAZIDE 12.5; 5 MG/1; MG/1
50-12.5 TABLET ORAL DAILY
Qty: 30 | Refills: 0 | Status: ACTIVE | COMMUNITY
Start: 2025-01-14 | End: 1900-01-01

## 2025-02-03 ENCOUNTER — APPOINTMENT (OUTPATIENT)
Dept: CARDIOLOGY | Facility: CLINIC | Age: 55
End: 2025-02-03
Payer: COMMERCIAL

## 2025-02-03 PROCEDURE — ZZZZZ: CPT

## 2025-02-03 PROCEDURE — 93015 CV STRESS TEST SUPVJ I&R: CPT

## 2025-02-03 PROCEDURE — 93306 TTE W/DOPPLER COMPLETE: CPT

## 2025-02-18 ENCOUNTER — APPOINTMENT (OUTPATIENT)
Dept: CARDIOLOGY | Facility: CLINIC | Age: 55
End: 2025-02-18
Payer: COMMERCIAL

## 2025-02-18 ENCOUNTER — NON-APPOINTMENT (OUTPATIENT)
Age: 55
End: 2025-02-18

## 2025-02-18 ENCOUNTER — RX RENEWAL (OUTPATIENT)
Age: 55
End: 2025-02-18

## 2025-02-18 VITALS
WEIGHT: 237 LBS | SYSTOLIC BLOOD PRESSURE: 140 MMHG | BODY MASS INDEX: 35.92 KG/M2 | HEIGHT: 68 IN | HEART RATE: 78 BPM | DIASTOLIC BLOOD PRESSURE: 92 MMHG

## 2025-02-18 DIAGNOSIS — E88.810 METABOLIC SYNDROME: ICD-10-CM

## 2025-02-18 DIAGNOSIS — I10 ESSENTIAL (PRIMARY) HYPERTENSION: ICD-10-CM

## 2025-02-18 DIAGNOSIS — E78.2 MIXED HYPERLIPIDEMIA: ICD-10-CM

## 2025-02-18 PROCEDURE — 93000 ELECTROCARDIOGRAM COMPLETE: CPT

## 2025-02-18 PROCEDURE — 99213 OFFICE O/P EST LOW 20 MIN: CPT | Mod: 25

## 2025-02-24 ENCOUNTER — OUTPATIENT (OUTPATIENT)
Dept: OUTPATIENT SERVICES | Facility: HOSPITAL | Age: 55
LOS: 1 days | End: 2025-02-24
Payer: COMMERCIAL

## 2025-02-24 ENCOUNTER — APPOINTMENT (OUTPATIENT)
Dept: INTERNAL MEDICINE | Facility: CLINIC | Age: 55
End: 2025-02-24

## 2025-02-24 VITALS
BODY MASS INDEX: 35.46 KG/M2 | WEIGHT: 234 LBS | DIASTOLIC BLOOD PRESSURE: 88 MMHG | HEIGHT: 68 IN | OXYGEN SATURATION: 96 % | SYSTOLIC BLOOD PRESSURE: 136 MMHG | HEART RATE: 98 BPM

## 2025-02-24 DIAGNOSIS — R51.9 HEADACHE, UNSPECIFIED: ICD-10-CM

## 2025-02-24 DIAGNOSIS — R07.9 CHEST PAIN, UNSPECIFIED: ICD-10-CM

## 2025-02-24 DIAGNOSIS — H81.10 BENIGN PAROXYSMAL VERTIGO, UNSPECIFIED EAR: ICD-10-CM

## 2025-02-24 DIAGNOSIS — I10 ESSENTIAL (PRIMARY) HYPERTENSION: ICD-10-CM

## 2025-02-24 DIAGNOSIS — G56.03 CARPAL TUNNEL SYNDROM,BILATERAL UPPER LIMBS: ICD-10-CM

## 2025-02-24 PROCEDURE — G0463: CPT

## 2025-02-24 PROCEDURE — 99203 OFFICE O/P NEW LOW 30 MIN: CPT | Mod: GE

## 2025-03-10 DIAGNOSIS — G56.03 CARPAL TUNNEL SYNDROME, BILATERAL UPPER LIMBS: ICD-10-CM

## 2025-03-10 DIAGNOSIS — R51.9 HEADACHE, UNSPECIFIED: ICD-10-CM

## 2025-03-10 DIAGNOSIS — R07.9 CHEST PAIN, UNSPECIFIED: ICD-10-CM

## 2025-06-27 ENCOUNTER — RX RENEWAL (OUTPATIENT)
Age: 55
End: 2025-06-27

## 2025-07-25 ENCOUNTER — OUTPATIENT (OUTPATIENT)
Dept: OUTPATIENT SERVICES | Facility: HOSPITAL | Age: 55
LOS: 1 days | End: 2025-07-25
Payer: COMMERCIAL

## 2025-07-25 ENCOUNTER — APPOINTMENT (OUTPATIENT)
Dept: INTERNAL MEDICINE | Facility: CLINIC | Age: 55
End: 2025-07-25
Payer: SELF-PAY

## 2025-07-25 VITALS
HEART RATE: 76 BPM | BODY MASS INDEX: 35.31 KG/M2 | DIASTOLIC BLOOD PRESSURE: 80 MMHG | HEIGHT: 68 IN | SYSTOLIC BLOOD PRESSURE: 140 MMHG | WEIGHT: 233 LBS | OXYGEN SATURATION: 97 %

## 2025-07-25 DIAGNOSIS — Z00.00 ENCOUNTER FOR GENERAL ADULT MEDICAL EXAMINATION W/OUT ABNORMAL FINDINGS: ICD-10-CM

## 2025-07-25 DIAGNOSIS — I10 ESSENTIAL (PRIMARY) HYPERTENSION: ICD-10-CM

## 2025-07-25 DIAGNOSIS — M79.672 PAIN IN LEFT FOOT: ICD-10-CM

## 2025-07-25 PROCEDURE — 80053 COMPREHEN METABOLIC PANEL: CPT

## 2025-07-25 PROCEDURE — 80061 LIPID PANEL: CPT

## 2025-07-25 PROCEDURE — 99213 OFFICE O/P EST LOW 20 MIN: CPT | Mod: GE

## 2025-07-25 PROCEDURE — G0463: CPT

## 2025-07-25 PROCEDURE — 83036 HEMOGLOBIN GLYCOSYLATED A1C: CPT

## 2025-07-25 PROCEDURE — 85027 COMPLETE CBC AUTOMATED: CPT

## 2025-07-28 DIAGNOSIS — M79.672 PAIN IN LEFT FOOT: ICD-10-CM

## 2025-07-28 LAB
ALBUMIN SERPL ELPH-MCNC: 4.7 G/DL
ALP BLD-CCNC: 89 U/L
ALT SERPL-CCNC: 31 U/L
ANION GAP SERPL CALC-SCNC: 14 MMOL/L
AST SERPL-CCNC: 30 U/L
BILIRUB SERPL-MCNC: 0.6 MG/DL
BUN SERPL-MCNC: 17 MG/DL
CALCIUM SERPL-MCNC: 10 MG/DL
CHLORIDE SERPL-SCNC: 103 MMOL/L
CHOLEST SERPL-MCNC: 184 MG/DL
CO2 SERPL-SCNC: 24 MMOL/L
CREAT SERPL-MCNC: 1.14 MG/DL
EGFRCR SERPLBLD CKD-EPI 2021: 76 ML/MIN/1.73M2
ESTIMATED AVERAGE GLUCOSE: 123 MG/DL
GLUCOSE SERPL-MCNC: 108 MG/DL
HBA1C MFR BLD HPLC: 5.9 %
HCT VFR BLD CALC: 40.3 %
HDLC SERPL-MCNC: 48 MG/DL
HGB BLD-MCNC: 13.7 G/DL
LDLC SERPL-MCNC: 99 MG/DL
MCHC RBC-ENTMCNC: 29.3 PG
MCHC RBC-ENTMCNC: 34 G/DL
MCV RBC AUTO: 86.3 FL
NONHDLC SERPL-MCNC: 136 MG/DL
PLATELET # BLD AUTO: 212 K/UL
POTASSIUM SERPL-SCNC: 4.4 MMOL/L
PROT SERPL-MCNC: 7.6 G/DL
RBC # BLD: 4.67 M/UL
RBC # FLD: 13.2 %
SODIUM SERPL-SCNC: 140 MMOL/L
TRIGL SERPL-MCNC: 213 MG/DL
WBC # FLD AUTO: 4.94 K/UL

## 2025-08-18 ENCOUNTER — APPOINTMENT (OUTPATIENT)
Dept: CARDIOLOGY | Facility: CLINIC | Age: 55
End: 2025-08-18
Payer: COMMERCIAL

## 2025-08-18 VITALS — DIASTOLIC BLOOD PRESSURE: 77 MMHG | SYSTOLIC BLOOD PRESSURE: 127 MMHG

## 2025-08-18 VITALS
BODY MASS INDEX: 35.46 KG/M2 | DIASTOLIC BLOOD PRESSURE: 80 MMHG | HEIGHT: 68 IN | SYSTOLIC BLOOD PRESSURE: 134 MMHG | WEIGHT: 234 LBS | HEART RATE: 68 BPM | OXYGEN SATURATION: 98 %

## 2025-08-18 DIAGNOSIS — I10 ESSENTIAL (PRIMARY) HYPERTENSION: ICD-10-CM

## 2025-08-18 PROCEDURE — 99213 OFFICE O/P EST LOW 20 MIN: CPT

## 2025-08-19 ENCOUNTER — NON-APPOINTMENT (OUTPATIENT)
Age: 55
End: 2025-08-19